# Patient Record
(demographics unavailable — no encounter records)

---

## 2025-04-30 NOTE — HISTORY OF PRESENT ILLNESS
[Patient reported mammogram was normal] : Patient reported mammogram was normal [N] : Patient does not use contraception [Y] : Positive pregnancy history [Normal Amount/Duration] :  normal amount and duration [Menarche Age: ____] : age at menarche was [unfilled] [Currently Active] : currently active [Men] : men [No] : No [Patient refuses STI testing] : Patient refuses STI testing [Mammogramdate] : 2024 [TextBox_19] : as per patient  [LMPDate] : 4/18/2025 [PGxTotal] : 1 [MensesLength] : 7 [[facility]] : 0 [FreeTextEntry1] : 4/18/2025

## 2025-04-30 NOTE — PLAN
[FreeTextEntry1] : PCOS discussed at length.  Weight management and diet with exercise encouraged.  Metformin and GLP1 medications for weight loss and insulin resistance discussed.  OCP's also discussed to regulate menses.  She prefers to avoid meds for now.  RTO 3 mos

## 2025-04-30 NOTE — PHYSICAL EXAM
[MA] : MA [Appropriately responsive] : appropriately responsive [Alert] : alert [No Acute Distress] : no acute distress [Soft] : soft [Non-tender] : non-tender [Non-distended] : non-distended [No HSM] : No HSM [No Lesions] : no lesions [No Mass] : no mass [Oriented x3] : oriented x3 [Examination Of The Breasts] : a normal appearance [No Masses] : no breast masses were palpable [Labia Majora] : normal [Labia Minora] : normal [Normal] : normal [Retroversion] : retroverted [Uterine Adnexae] : normal [FreeTextEntry2] : Muna [Tenderness] : nontender [Enlarged ___ wks] : not enlarged [Mass ___ cm] : no uterine mass was palpated